# Patient Record
Sex: FEMALE | Race: BLACK OR AFRICAN AMERICAN | Employment: OTHER | URBAN - METROPOLITAN AREA
[De-identification: names, ages, dates, MRNs, and addresses within clinical notes are randomized per-mention and may not be internally consistent; named-entity substitution may affect disease eponyms.]

---

## 2024-09-03 ENCOUNTER — APPOINTMENT (OUTPATIENT)
Dept: ULTRASOUND IMAGING | Age: 74
End: 2024-09-03
Attending: NURSE PRACTITIONER
Payer: MEDICARE

## 2024-09-03 ENCOUNTER — HOSPITAL ENCOUNTER (OUTPATIENT)
Age: 74
Discharge: HOME | End: 2024-09-03
Payer: MEDICARE

## 2024-09-03 VITALS
HEART RATE: 109 BPM | OXYGEN SATURATION: 100 % | SYSTOLIC BLOOD PRESSURE: 140 MMHG | DIASTOLIC BLOOD PRESSURE: 81 MMHG | TEMPERATURE: 98 F | RESPIRATION RATE: 20 BRPM

## 2024-09-03 DIAGNOSIS — M79.604 RIGHT LEG PAIN: Primary | ICD-10-CM

## 2024-09-03 PROCEDURE — 93971 EXTREMITY STUDY: CPT | Performed by: NURSE PRACTITIONER

## 2024-09-03 PROCEDURE — 99203 OFFICE O/P NEW LOW 30 MIN: CPT | Performed by: NURSE PRACTITIONER

## 2024-09-03 RX ORDER — METOPROLOL SUCCINATE 50 MG/1
50 TABLET, EXTENDED RELEASE ORAL DAILY
COMMUNITY
Start: 2023-11-16

## 2024-09-03 RX ORDER — ATENOLOL 100 MG/1
150 TABLET ORAL 2 TIMES DAILY
COMMUNITY

## 2024-09-03 RX ORDER — LOSARTAN POTASSIUM 50 MG/1
50 TABLET ORAL DAILY
COMMUNITY
Start: 2024-01-08

## 2024-09-03 RX ORDER — POLYETHYLENE GLYCOL 400 AND PROPYLENE GLYCOL 4; 3 MG/ML; MG/ML
SOLUTION/ DROPS OPHTHALMIC
COMMUNITY

## 2024-09-03 RX ORDER — ESTRADIOL 0.1 MG/G
1 CREAM VAGINAL NIGHTLY
COMMUNITY
Start: 2024-07-19

## 2024-09-03 NOTE — ED INITIAL ASSESSMENT (HPI)
Pt here with atraumatic right groin pain. Pt took a nap on Sunday and when she woke up she had intense right groin pain and had difficulty with ambulation. Pt woke up with pain again Monday morning and had difficulty stepping forward with right leg. Pt was woken up last night 3 different times with severe pain.  Denies numbness or tingling. Pt is on Pradaxa.

## 2024-10-24 ENCOUNTER — HOSPITAL ENCOUNTER (OUTPATIENT)
Age: 74
Discharge: HOME | End: 2024-10-24
Payer: MEDICARE

## 2024-10-24 VITALS
SYSTOLIC BLOOD PRESSURE: 142 MMHG | DIASTOLIC BLOOD PRESSURE: 90 MMHG | OXYGEN SATURATION: 100 % | TEMPERATURE: 97 F | RESPIRATION RATE: 20 BRPM | HEART RATE: 111 BPM

## 2024-10-24 DIAGNOSIS — S81.802A OPEN WOUND OF LEFT LOWER LEG, INITIAL ENCOUNTER: Primary | ICD-10-CM

## 2024-10-24 PROCEDURE — 99213 OFFICE O/P EST LOW 20 MIN: CPT | Performed by: NURSE PRACTITIONER

## 2024-10-24 RX ORDER — MUPIROCIN 20 MG/G
1 OINTMENT TOPICAL 3 TIMES DAILY
Qty: 1 EACH | Refills: 0 | Status: SHIPPED | OUTPATIENT
Start: 2024-10-24 | End: 2024-11-07

## 2024-10-25 NOTE — ED INITIAL ASSESSMENT (HPI)
Pt came in due to scab in left lower leg that wont heal. Pt c/o redness, swelling, and tenderness for the past week. Pt stated it wont heal. Pt has easy non labored respirations.

## 2024-10-25 NOTE — ED PROVIDER NOTES
Patient Seen in: [facility]      History     Chief Complaint   Patient presents with    Derm Problem     Stated Complaint: Leg Problem    Subjective:   HPI  74-year-old female presents to the immediate care center with concern for wound to lateral aspect of her left lower leg.  Has been present for a couple of weeks and she noticed it after she had been working outside in her garden.  For the last couple of days it has been tender and having reddened margins.  There was a scab on it today.  She picked the scab off concerned that there may be something underlying and embedded in the wound.  She is here today, concern for infection.            Objective:     Past Medical History:    Arrhythmia    Mitral valve prolapse              History reviewed. No pertinent surgical history.             No pertinent social history.            Review of Systems   Constitutional:  Negative for chills and fever.   Skin:  Positive for wound.   Neurological:  Negative for weakness and numbness.       Positive for stated complaint: Leg Problem  Other systems are as noted in HPI.  Constitutional and vital signs reviewed.      All other systems reviewed and negative except as noted above.    Physical Exam     ED Triage Vitals [10/24/24 1922]   /90   Pulse 111   Resp 20   Temp 97.3 °F (36.3 °C)   Temp src Temporal   SpO2 100 %   O2 Device None (Room air)       Current Vitals:   Vital Signs  BP: 142/90  Pulse: 111  Resp: 20  Temp: 97.3 °F (36.3 °C)  Temp src: Temporal    Oxygen Therapy  SpO2: 100 %  O2 Device: None (Room air)        Physical Exam  Vitals and nursing note reviewed.   Constitutional:       General: She is not in acute distress.  Pulmonary:      Effort: Pulmonary effort is normal. No respiratory distress.   Musculoskeletal:        Legs:    Skin:     General: Skin is warm and dry.   Neurological:      Mental Status: She is alert and oriented to person, place, and time.   Psychiatric:         Behavior:  Behavior normal.             ED Course   Labs Reviewed - No data to display                MDM      Patient was advised that the erythema visible is either inflammation versus mild cellulitis.  She is very concerned for potential infection.  She was informed that we will have her apply mupirocin to empirically treat mild cellulitis.  She will follow with her primary care provider next week if symptoms or not resolved.            Medical Decision Making  Differential diagnoses considered today include, but are not exclusive of: Inflammatory response, cellulitis, retained foreign body.    Problems Addressed:  Open wound of left lower leg, initial encounter: self-limited or minor problem    Risk  Prescription drug management.        Disposition and Plan     Clinical Impression:  1. Open wound of left lower leg, initial encounter         Disposition:  Discharge  10/24/2024  7:30 pm    Follow-up:  Your primary care provider  Call to schedule appointment to be seen in 5-7 days.    As needed          Medications Prescribed:  Current Discharge Medication List        START taking these medications    Details   mupirocin 2 % External Ointment Apply 1 Application topically 3 (three) times daily for 14 days.  Qty: 1 each, Refills: 0                 Supplementary Documentation:

## 2025-07-15 ENCOUNTER — HOSPITAL ENCOUNTER (OUTPATIENT)
Age: 75
Discharge: HOME | End: 2025-07-15
Payer: MEDICARE

## 2025-07-15 VITALS
RESPIRATION RATE: 16 BRPM | DIASTOLIC BLOOD PRESSURE: 54 MMHG | SYSTOLIC BLOOD PRESSURE: 123 MMHG | HEART RATE: 98 BPM | TEMPERATURE: 98 F | OXYGEN SATURATION: 100 %

## 2025-07-15 DIAGNOSIS — T16.1XXA FOREIGN BODY OF RIGHT EAR, INITIAL ENCOUNTER: Primary | ICD-10-CM

## 2025-07-15 PROCEDURE — 69200 CLEAR OUTER EAR CANAL: CPT | Performed by: NURSE PRACTITIONER

## 2025-07-15 NOTE — ED PROVIDER NOTES
He    Patient Seen in: [facility]      History     Chief Complaint   Patient presents with    FB in Ear     Stated Complaint: R Ear Pain  Subjective:   74-year-old female with a history of mitral valve prolapse presents for a possible insect in her right ear.  She states she was doing yard work and felt something fly into her ear and now she feels movement.  No drainage or bleeding.  No headaches or dizziness.  No hearing loss.  She appears nontoxic.      Objective:   Past Medical History:    Arrhythmia    Mitral valve prolapse            History reviewed. No pertinent surgical history.           Social History     Socioeconomic History    Marital status:    Tobacco Use    Smoking status: Never    Smokeless tobacco: Never            Review of Systems    Positive for stated complaint: FB in Ear     Other systems are as noted in HPI.  Constitutional and vital signs reviewed.      All other systems reviewed and negative except as noted above.    Physical Exam     ED Triage Vitals [07/15/25 0900]   /54   Pulse 98   Resp 16   Temp 98 °F (36.7 °C)   Temp src Oral   SpO2 100 %   O2 Device None (Room air)     Current:/54   Pulse 98   Temp 98 °F (36.7 °C) (Oral)   Resp 16   SpO2 100%     Physical Exam  Vitals and nursing note reviewed.   Constitutional:       General: She is not in acute distress.     Appearance: Normal appearance. She is not toxic-appearing.   HENT:      Ears:      Comments: Moving and in the right ear canal.  The TM is normal.  Pulmonary:      Effort: Pulmonary effort is normal.   Musculoskeletal:         General: Normal range of motion.   Skin:     General: Skin is warm and dry.      Capillary Refill: Capillary refill takes less than 2 seconds.   Neurological:      General: No focal deficit present.      Mental Status: She is alert and oriented to person, place, and time.   Psychiatric:         Mood and Affect: Mood normal.         Behavior: Behavior normal.         ED  Course   No results found.  Labs Reviewed - No data to display    MDM     Medical Decision Making  A few drops of lidocaine were placed in the right ear canal to kill the aunt.  Then the aunt was flushed out with water.  The patient tolerated the procedure well.  The TM and canal are both clear without any signs of infection or trauma.  She will follow-up as needed with her primary care provider.    Risk  OTC drugs.  Risk Details: Otitis externa versus foreign body in the ear        Disposition and Plan     Clinical Impression:  1. Foreign body of right ear, initial encounter         Disposition:  Discharge  7/15/2025  9:25 am    Follow-up:  PMD    Schedule an appointment as soon as possible for a visit   As needed          Medications Prescribed:  Current Discharge Medication List